# Patient Record
Sex: FEMALE | Race: WHITE
[De-identification: names, ages, dates, MRNs, and addresses within clinical notes are randomized per-mention and may not be internally consistent; named-entity substitution may affect disease eponyms.]

---

## 2017-01-27 ENCOUNTER — HOSPITAL ENCOUNTER (EMERGENCY)
Dept: HOSPITAL 84 - D.ER | Age: 47
Discharge: LEFT BEFORE DISCHARGE | End: 2017-01-27
Payer: MEDICAID

## 2017-01-27 VITALS — BODY MASS INDEX: 34.5 KG/M2

## 2017-01-27 DIAGNOSIS — R42: Primary | ICD-10-CM

## 2017-02-09 ENCOUNTER — HOSPITAL ENCOUNTER (OUTPATIENT)
Dept: HOSPITAL 84 - D.RAD | Age: 47
Discharge: HOME | End: 2017-02-09
Attending: GENERAL PRACTICE
Payer: MEDICAID

## 2017-02-09 VITALS — BODY MASS INDEX: 34.5 KG/M2

## 2017-02-09 DIAGNOSIS — S86.911A: Primary | ICD-10-CM

## 2017-05-03 ENCOUNTER — HOSPITAL ENCOUNTER (OUTPATIENT)
Dept: HOSPITAL 84 - D.MAMMO | Age: 47
Discharge: HOME | End: 2017-05-03
Attending: GENERAL PRACTICE
Payer: MEDICAID

## 2017-05-03 VITALS — BODY MASS INDEX: 34.5 KG/M2

## 2017-05-03 DIAGNOSIS — N64.4: Primary | ICD-10-CM

## 2017-05-05 ENCOUNTER — HOSPITAL ENCOUNTER (OUTPATIENT)
Dept: HOSPITAL 84 - D.RAD | Age: 47
Discharge: HOME | End: 2017-05-05
Attending: ORTHOPAEDIC SURGERY
Payer: MEDICAID

## 2017-05-05 VITALS — BODY MASS INDEX: 34.5 KG/M2

## 2017-05-05 DIAGNOSIS — M25.511: Primary | ICD-10-CM

## 2017-06-21 LAB
ERYTHROCYTE [DISTWIDTH] IN BLOOD BY AUTOMATED COUNT: 13.2 % (ref 11.5–14.5)
HCT VFR BLD CALC: 43.5 % (ref 36–48)
HGB BLD-MCNC: 15 G/DL (ref 12–16)
MCH RBC QN AUTO: 34.3 PG (ref 26–34)
MCHC RBC AUTO-ENTMCNC: 34.5 G/DL (ref 31–37)
MCV RBC: 99.5 FL (ref 80–100)
PLATELET # BLD: 180 10X3/UL (ref 130–400)
PMV BLD AUTO: 10.4 FL (ref 7.4–10.4)
RBC # BLD AUTO: 4.37 10X6/UL (ref 4–5.4)
WBC # BLD AUTO: 7.8 10X3/UL (ref 4.8–10.8)

## 2017-06-22 ENCOUNTER — HOSPITAL ENCOUNTER (OUTPATIENT)
Dept: HOSPITAL 84 - D.OPS | Age: 47
Discharge: HOME | End: 2017-06-22
Attending: ORTHOPAEDIC SURGERY
Payer: MEDICAID

## 2017-06-22 VITALS — DIASTOLIC BLOOD PRESSURE: 71 MMHG | SYSTOLIC BLOOD PRESSURE: 116 MMHG

## 2017-06-22 VITALS — WEIGHT: 205 LBS | BODY MASS INDEX: 34.16 KG/M2 | HEIGHT: 65 IN

## 2017-06-22 DIAGNOSIS — Z01.812: ICD-10-CM

## 2017-06-22 DIAGNOSIS — M75.101: Primary | ICD-10-CM

## 2017-06-22 DIAGNOSIS — Z96.611: ICD-10-CM

## 2017-06-22 NOTE — OP
PATIENT NAME:  ANUSHKA GARNICA                  MEDICAL RECORD: [id number]
:70                                             LOCATION:D.OPS          
                                                         ADMISSION DATE:        
SURGEON:  RAISSA PARKER MD        
 
 
DATE OF OPERATION:  2017
 
PREOPERATIVE DIAGNOSIS:  Rotator cuff tear of the right shoulder, status post
total shoulder arthroplasty.
 
POSTOPERATIVE DIAGNOSIS:  Rotator cuff tear of the right shoulder, status post
total shoulder arthroplasty.
 
PROCEDURES:
1.  Right shoulder arthroscopy with arthroscopic evaluation.
2.  Open rotator cuff repair.
 
SURGEON:  Raissa Parker MD.
 
ANESTHESIA:  General.
 
INTRAOPERATIVE COMPLICATIONS:  None.
 
SUMMARY OF PATHOLOGIC FINDINGS:  The total shoulder arthroplasty appeared to be
well fixed as did the glenoid component.  The patient did have a rotator cuff
tear that was visualized from the implant surface side; however, it required
mini arthrotomy for rotator cuff repair.
 
OPERATIVE SUMMARY IN DETAIL:  After obtaining the appropriate preoperative
orthopedic surgery consent as well as anesthetic consultation, evaluation and
clearance, the patient was brought to the operating room and placed on the
operating table in supine position.  After general laryngeal mask was
administered, the patient was placed in the left lateral decubitus position. 
All pressure points were well padded to include down leg peroneal pad as well as
axillary roll.  The patient was held firmly to the operating table using the
vacuum pack suction system.  Right upper extremity and shoulder were prepped and
draped in routine sterile fashion.  The arm was held in the Arthrex traction
boom at 30 degrees of forward flexion, 30 degrees of abduction with 10 pounds of
traction laterally.  Arthroscopy was established in the glenohumeral joint from
a posterior portal.  Anterior portal was established in the anterior safe
interval.  Diagnostic arthroscopy did reveal the above findings, that is, the
prosthesis and the glenoid component appeared to be stable without looseness. 
Attention was then turned to the subacromial space.  While on the subacromial
space, the patient had substantial amounts of scar tissue and adhesions.  At
this point, the decision was made to proceed with the mini arthrotomy. 
Anterolateral mini arthrotomy was created, taken down to the superficial and
deep deltoid fascia for direct visualization of the rotator cuff tear. 
Combination of rongeurs, knife as well as the arthroscopic shaver were utilized
to debride the rotator cuff and decorticate the lateral portion of the greater
tuberosity.  The prosthesis itself did not seem to be proud in any way.  Two #2
FiberTapes were placed in an inverted mattress style fashion and each were
anchored laterally with a 5.5 SwiveLock from Arthrex down both the anterior and
posterior to the stem with good fixation.  This resulted in excellent anatomic
repair of the rotator cuff.  Having completed this, the wound was copiously
irrigated.  Superficial and deep fascia were closed with #1 Vicryl, followed by
2-0 Vicryl and skin staples.  Sterile dressings were applied.  The patient was
awakened and taken to the recovery room in stable condition.  All final needle
 
 
 
OPERATIVE REPORT                               [id number]    ANUSHKA GARNICA
 
 
and sponge counts were correct.
 
TRANSINT:[id number] Voice Confirmation ID: [id number] DOCUMENT ID: [id number]
                                           
                                           ELENA SERRANO, RAISSA YARBROUGH        
 
 
 
 
 
 
 
 
 
 
 
 
 
 
 
 
 
 
 
 
 
 
 
 
 
 
 
 
 
 
 
 
 
 
 
 
 
 
 
 
 
 
CC:                                                             [id number]
DICTATION DATE: 17     :     17      [facility] 
                                                                      17
[address] [place] [address]

## 2017-08-05 ENCOUNTER — HOSPITAL ENCOUNTER (EMERGENCY)
Dept: HOSPITAL 84 - D.ER | Age: 47
Discharge: HOME | End: 2017-08-05
Payer: MEDICAID

## 2017-08-05 VITALS — BODY MASS INDEX: 34.1 KG/M2

## 2017-08-05 DIAGNOSIS — F32.9: Primary | ICD-10-CM

## 2017-08-05 DIAGNOSIS — E03.9: ICD-10-CM

## 2017-08-05 DIAGNOSIS — R11.0: ICD-10-CM

## 2017-08-05 DIAGNOSIS — Z86.59: ICD-10-CM

## 2017-10-05 ENCOUNTER — HOSPITAL ENCOUNTER (OUTPATIENT)
Dept: HOSPITAL 84 - D.MRI | Age: 47
Discharge: HOME | End: 2017-10-05
Attending: ORTHOPAEDIC SURGERY
Payer: MEDICAID

## 2017-10-05 VITALS — BODY MASS INDEX: 34.1 KG/M2

## 2017-10-05 DIAGNOSIS — M54.16: Primary | ICD-10-CM

## 2017-10-24 ENCOUNTER — HOSPITAL ENCOUNTER (OUTPATIENT)
Dept: HOSPITAL 84 - D.OPS | Age: 47
Discharge: HOME | End: 2017-10-24
Attending: ORTHOPAEDIC SURGERY
Payer: MEDICAID

## 2017-10-24 VITALS — BODY MASS INDEX: 30.3 KG/M2

## 2017-10-24 DIAGNOSIS — M53.3: Primary | ICD-10-CM

## 2017-10-25 NOTE — PRO
PATIENT:ANUSHKA OJSEPH                         MEDICAL RECORD: [id number]
: 70                                            LOCATION:D.OPS          
                                                         ADMISSION DATE: 10/24/17
 
PROCEDURE PERFORMED BY: JUSTO ALMAGUER MD              
 
 
DATE OF PROCEDURE:  10/24/2017
 
Ms. Joseph is a 47-year-old  female, who is referred by Dr. Pollo Marquez for left leg radiculopathy and hip pain.  The patient relates that she
has had bilateral hip surgery for avascular necrosis and bilateral hip
replacement.  The patient relates that she is having hip discomfort and some
radiation into her left leg.  The patient has been seen by Dr. Pollo Marquez and
worked up.  MRI of the back suggests that patient has significant disc disease
along multiple levels from T12-L1, L1-L2, L4-L5 and L5-S1.  The patient has
small-to-moderate bulging discs along those levels with no impingement on cord
though.  The patient presents today with a pain level of 8/10.  The patient does
relate that it has been there for about 3 months.  The patient is looking for
resolution and evaluation of pain.  
 
After evaluating the patient, it should be noted that the patient is currently
on no anticoagulation medication.  THE PATIENT HAS ALLERGIES TO BACTRIM,
ERYTHROMYCIN, PEPCID AND LATEX.  The patient currently also has no heart
disease, cardiovascular or lung disease.  The patient is a well-proportioned
female.  Risks and benefits were explained to the patient including risk of
infection, bleeding and damage to underlying structures.  The patient
understands and accepts.  The patient has had an epidural in the past for labor
and understands the risk involved.  
 
The patient was placed in sitting position, Betadine prepped and draped.  The
patient had local anesthetic of 1% lidocaine used to raise a skin wheal over the
L4-L5 area.  A total of 10 mL of solution was injected which included 0.25%
bupivacaine along with 80 mg Depo-Medrol and approximately 5 mL of sterile
saline to bring it to a total volume of 10 mL.  The epidural space was located
with a loss of resistance technique with 17-gauge Tuohy needle.  After loss of
resistance location of the epidural space, the solution of 10 mL was injected
via the Tuohy needle into the epidural space at that time.  Needle was removed. 
The patient tolerated the procedure well.  
 
Upon examination, I also felt that the patient may have some SI joint
involvement on the left side.  Palpation over the SI joint elicited severe
discomfort.  Discussed the patient SI joint injection and she said absolutely. 
Risks and benefits again were explained with the patient.  The patient consents
to have the SI joint injected on the left side.  
 
We were able to use another 10 mL of 0.25% bupivacaine along with 80 mg
Depo-Medrol.  After skin wheal, a 1% lidocaine was raised over the left SI joint
area and a 22-gauge spinal needle was then used under ultrasound guidance to
locate the left SI joint and the medication of 0.25% bupivacaine along with 80
mg Depo-Medrol 10 mL was injected into the left SI joint area.  The patient
seemed to respond to the therapy and discomfort diminished from 8/10 to 4/10
after injection series.  
 
It should be noted that the patient is to be followed up in 1 week.  The patient
was discharged home.  The patient tolerated the procedure well.
 
TRANSINT:[id number] Voice Confirmation ID: [id number] DOCUMENT ID: [id number]
 
 
 
PROCEDURE NOTE                                 [id number]    ANUSHKA JOSEPH FARRELL MD              
 
 
 
Electronically Signed by JUSTO ALMAGUER on 10/25/17 at 1011
 
 
 
 
 
 
 
 
 
 
 
 
 
 
 
 
 
 
 
 
 
 
 
 
 
 
 
 
 
 
 
 
 
 
 
 
 
 
 
 
CC:                                                             [id number]
DICTATION DATE: 10/24/17 1428     :     10/24/17 2257      [facility] 
                                                                      10/24/17
[id number] [place] [address]

## 2017-11-01 ENCOUNTER — HOSPITAL ENCOUNTER (OUTPATIENT)
Dept: HOSPITAL 84 - D.OPS | Age: 47
Discharge: HOME | End: 2017-11-01
Attending: ORTHOPAEDIC SURGERY
Payer: MEDICAID

## 2017-11-01 VITALS — BODY MASS INDEX: 30.3 KG/M2

## 2017-11-01 VITALS — DIASTOLIC BLOOD PRESSURE: 95 MMHG | SYSTOLIC BLOOD PRESSURE: 134 MMHG

## 2017-11-01 DIAGNOSIS — M53.3: Primary | ICD-10-CM

## 2017-11-01 DIAGNOSIS — Z01.812: ICD-10-CM

## 2017-11-08 ENCOUNTER — HOSPITAL ENCOUNTER (OUTPATIENT)
Dept: HOSPITAL 84 - D.OPS | Age: 47
Discharge: HOME | End: 2017-11-08
Attending: SURGERY
Payer: MEDICAID

## 2017-11-08 VITALS — SYSTOLIC BLOOD PRESSURE: 114 MMHG | DIASTOLIC BLOOD PRESSURE: 71 MMHG

## 2017-11-08 VITALS — BODY MASS INDEX: 30.3 KG/M2

## 2017-11-08 DIAGNOSIS — M54.5: ICD-10-CM

## 2017-11-08 DIAGNOSIS — M53.3: Primary | ICD-10-CM

## 2017-11-15 ENCOUNTER — HOSPITAL ENCOUNTER (OUTPATIENT)
Dept: HOSPITAL 84 - D.OPS | Age: 47
Discharge: HOME | End: 2017-11-15
Attending: ORTHOPAEDIC SURGERY
Payer: MEDICAID

## 2017-11-15 VITALS — HEIGHT: 65 IN | WEIGHT: 216.45 LBS | BODY MASS INDEX: 36.06 KG/M2

## 2017-11-15 VITALS — SYSTOLIC BLOOD PRESSURE: 124 MMHG | DIASTOLIC BLOOD PRESSURE: 70 MMHG

## 2017-11-15 DIAGNOSIS — S39.012A: Primary | ICD-10-CM

## 2017-11-15 DIAGNOSIS — Z01.812: ICD-10-CM

## 2018-04-16 ENCOUNTER — HOSPITAL ENCOUNTER (OUTPATIENT)
Dept: HOSPITAL 84 - D.NM | Age: 48
Discharge: HOME | End: 2018-04-16
Attending: ORTHOPAEDIC SURGERY
Payer: MEDICAID

## 2018-04-16 VITALS — BODY MASS INDEX: 36 KG/M2

## 2018-04-16 DIAGNOSIS — M25.551: ICD-10-CM

## 2018-04-16 DIAGNOSIS — M53.3: Primary | ICD-10-CM

## 2018-08-06 ENCOUNTER — HOSPITAL ENCOUNTER (OUTPATIENT)
Dept: HOSPITAL 84 - D.MRI | Age: 48
Discharge: HOME | End: 2018-08-06
Attending: GENERAL PRACTICE
Payer: MEDICAID

## 2018-08-06 VITALS — BODY MASS INDEX: 36 KG/M2

## 2018-08-06 DIAGNOSIS — M54.16: Primary | ICD-10-CM

## 2018-09-20 ENCOUNTER — HOSPITAL ENCOUNTER (EMERGENCY)
Dept: HOSPITAL 84 - D.ER | Age: 48
Discharge: HOME | End: 2018-09-20
Payer: MEDICAID

## 2018-09-20 VITALS
WEIGHT: 193.4 LBS | BODY MASS INDEX: 32.22 KG/M2 | HEIGHT: 65 IN | BODY MASS INDEX: 32.22 KG/M2 | WEIGHT: 193.4 LBS | HEIGHT: 65 IN

## 2018-09-20 VITALS — SYSTOLIC BLOOD PRESSURE: 150 MMHG | DIASTOLIC BLOOD PRESSURE: 92 MMHG

## 2018-09-20 DIAGNOSIS — M79.1: Primary | ICD-10-CM

## 2018-09-20 DIAGNOSIS — M25.551: ICD-10-CM

## 2018-09-20 DIAGNOSIS — F17.200: ICD-10-CM

## 2018-09-21 ENCOUNTER — HOSPITAL ENCOUNTER (EMERGENCY)
Dept: HOSPITAL 84 - D.ER | Age: 48
Discharge: HOME | End: 2018-09-21
Payer: MEDICAID

## 2018-09-21 VITALS
BODY MASS INDEX: 32.22 KG/M2 | HEIGHT: 65 IN | HEIGHT: 65 IN | WEIGHT: 193.4 LBS | WEIGHT: 193.4 LBS | BODY MASS INDEX: 32.22 KG/M2

## 2018-09-21 VITALS — DIASTOLIC BLOOD PRESSURE: 89 MMHG | SYSTOLIC BLOOD PRESSURE: 142 MMHG

## 2018-09-21 DIAGNOSIS — M25.551: ICD-10-CM

## 2018-09-21 DIAGNOSIS — Z91.81: ICD-10-CM

## 2018-09-21 DIAGNOSIS — F17.200: ICD-10-CM

## 2018-09-21 DIAGNOSIS — M79.1: Primary | ICD-10-CM

## 2018-10-22 ENCOUNTER — HOSPITAL ENCOUNTER (OUTPATIENT)
Dept: HOSPITAL 84 - D.PT | Age: 48
Discharge: HOME | End: 2018-10-22
Attending: ORTHOPAEDIC SURGERY
Payer: MEDICAID

## 2018-10-22 VITALS — BODY MASS INDEX: 32.1 KG/M2

## 2018-10-22 DIAGNOSIS — S72.111D: Primary | ICD-10-CM
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
## 2019-02-21 NOTE — NUR
RESUMING PATIENT CARE. PATIENT IS ALERT AND ORIENTED. RESPIRATIONS ARE EVEN
AND UNLABORED. NO S/S OF DISTRESS. NO C/O PAIN. DENIES NEEDS AT THIS TIME.
CALL LIGHT WITHIN REACH. WILL CPOC.

## 2019-02-21 NOTE — HEMODYNAMI
PATIENT:ANUSHKA GARNICA                         MEDICAL RECORD: [id number]
: 70                                            LOCATION:[facility]     D.2119
[id number]                                       ADMISSION DATE: 19
 
 
 Generatedon:20198:02
Patient name: ANUSHKA GARNICA Patient #: [id number] Visit #: [id number] SSN: 
: 1970 Date
of study: 2019
Page: Of
Hemodynamic Procedure Report
****************************
Patient Data
Patient Demographics
Procedure consent was obtained
First Name: ANUSHKA        Gender: Female
Last Name: DANIKA          : 1970
Middle Initial: GAVIN     Age: 48 year(s)
Patient #: [id number]       Race: Unknown
Visit #: [id number]
Accession #:
[id number]
Additional ID: [id number]
Contact details
Address: [address] [place] [address] Phone: [phone]
State: [place]
City: [place]
Zip code: [address]
Past Medical History
Allergies
Allergen        Reaction        Date         Comments
Reported
Other allergy                   2019    BACTRIM,
ERYTHROMYCIN,
PEPCID,FLONASE,
DILAUDID, SULFA,
LATEX
Admission
Admission Data
Admission Date: 2019   Admission Time: 14:17
Room #: D.2119
Lab Results
Lab Result Date: 2019  Lab Result Time: 0:00
Biochemistry
Name         Units    Result                Min      Max
BUN          mg/dl    9        --(*---)--   7        18
Creatinine   mg/dl    0.9      --(-*--)--   0.6      1.3
CBC
Name         Units    Result                Min      Max
Hemoglobin   g/dl     15.6     --(--*-)--   13.5     17.5
Procedure
Procedure Types
Cath Procedure
Diagnostic Procedure
LHC
LHC w/Coronaries
Sedation Charges
Moderate Sedation up to 15 minutes
PCI Procedure
 
Coronary Stent
Coronary Stent Initial
Procedure Description
Procedure Date
Procedure Date: 2019
Procedure Start Time: 7:35
Procedure End Time: 7:58
Procedure Staff
Name                            Function
Garrick Garcia MD                   Performing Physician
Yazmin Marx RN                  Nurse
Duke Marie RT                     Scrub
Penny Corea RT               Monitor
Procedure Data
Cath Procedure
Fluoroscopy
Diagnostic fluoroscopy      Total fluoroscopy Time: 5.2
time: 5.2 min               min
Diagnostic fluoroscopy      Total fluoroscopy dose:
dose: 1069 mGy              1069 mGy
Contrast Material
Contrast Material Type                       Amount (ml)
Isovue 300                                   100
Entry Location
Entry     Primary  Successful  Side  Size  Upsize Upsize Entry    Closure     Jensen
ccessful  Closure
Location                             (Fr)  1 (Fr) 2 (Fr) Remarks  Device        
          Remarks
Radial                         Right 6 Fr                         Mechanical
artery                               Short                        Compression
Estimated blood loss: 10 ml
Diagnostic catheters
Device Type               Used For           End Catheter
Placement
DIAGNOSTIC Ekron 110cm 5  Procedure
Fr catheter (008625)
Procedure Complications
No complications
Procedure Medications
Medication           Administration Route Dosage
Oxygen               etCO2 Nasal cannula  2 l/min
Lidocaine 2%         added to field       20
Heparin Flush Bag    added to field       2 bags
(1000units/500ml NS)
0.9% NaCl            I.V.                 100 ml/hr
Versed               I.V.                 2 mg
Fentanyl             I.V.                 100 mcg
Versed               I.V.                 1 mg
Fentanyl             I.V.                 50 mcg
Radial Cocktail      I.A.                 1 syringe
(Verapomil 2mg/Nitro
400mcg/Heparin
1500units)
Heparin Bolus        I.V.                 95187 units
Versed               I.V.                 1 mg
Fentanyl             I.V.                 50 mcg
Effient              P.O.                 60 mg
Hemodynamics
Rest
HGB: 15.6 (g/dl) Heart Rate: 77 (bpm)
 
Pressure Samples
Time     Site     Value (mmHg) Purpose      Heart      Use
Rate(bpm)
7:39     LV       115/3,17     Snapshot     73
7:40     AO       105/71(85)   Pullback     80
7:40     LV       110/0,18     Pullback     80
Gradients
Valve  Time  Site 1   Site 2     Mean    SEP/DFP    Peak To Heart  Use
(mmHg)  (sec/min)  Peak    Rate
(mmHg)  (bpm)
Aortic 7:40  LV       AO         4       4          5       80
110/0,18 105/71(85)
Calculations
Valve    P-P      Mean      Valve     Index  Valve    Source
Name              Gradient  Area             Flow
(cm2)
Aortic   5        4
5        4
Snapshots
Pre Cath      Intra         NCS           Post Cath
Vital Signs
Time    Heart  Resp   SPO2 etCO2   NIBP (mmHg) Rhythm  Pain Status Sedation
Rate   (ipm)  (%)  (mmHg)                                  Level
(bpm)
7:22:36 72     19     94   33      135/86(102) NSR     4 (11) ,    10(A)
Distressing
7:26:44 77     21     94   29.2    119/82(93)  NSR     4 (11) ,    10(A)
Distressing
7:30:46 74     14     92   22.5    124/79(97)  NSR     4 (11) ,    10(A)
Distressing
7:34:51 72     12     93   35.3    120/76(90)  NSR     4 (11) ,    10(A)
Distressing
7:40:01 94     11     94   19.5    98/66(93)   NSR     4 (11) ,    9(A)
Distressing
7:44:00 74     11     93   33      111/65(93)  NSR     4 (11) ,    9(A)
Distressing
7:48:04 74     12     94   21.8    113/64(96)  NSR     4 (11) ,    9(A)
Distressing
7:52:10 76     13     92   30      111/67(85)  NSR     4 (11) ,    9(A)
Distressing
7:56:10 76     15     94   36      118/78(91)  NSR     0 (11) , No 10(A)
pain
Medications
Time    Medication       Route   Dose    Verified Delivered Reason          Note
s    Effectiveness
by       by
7:18:20 Oxygen           etCO2   2 l/min Garrick     Buffie    used for
Nasal           Jose Marx RN   procedure
cannula
7:18:27 Lidocaine 2%     added   20ml    Garrick     Garrick      for local
to      vial    Jose Garcia MD  anesthetic
field
7:18:32 Heparin Flush    added   2 bags  Garrick     Garrick      used for
Bag              to              Jose Garcia MD  procedure
(1000units/500ml field
NS)
7:18:40 0.9% NaCl        I.V.    100     Garrick     Buffie    Per physician
ml/hr   Jose Marx RN
7:29:29 Versed           I.V.    2 mg    Garrick     Buffie    for sedation
Jose Marx RN
 
7:29:35 Fentanyl         I.V.    100 mcg Garrick     Buffie    for sedation
Jose Marx RN
7:34:54 Versed           I.V.    1 mg    Garrick     Buffie    for sedation
Jose Marx RN
7:34:58 Fentanyl         I.V.    50 mcg  Garrick     Buffie    for sedation
Jose Marx RN
7:37:29 Radial Cocktail  I.A.    1       Garrick     Garrick      for
(Verapomil               syringe Jose Garcia MD  vasodilation
2mg/Nitro
400mcg/Heparin
1500units)
7:46:24 Heparin Bolus    I.V.    10,000  Garrick     Buffie    for             veri
fied
units   Garcia MD Marx RN   anticoagulation with dr garcia
7:51:38 Versed           I.V.    1 mg    Garrick Arce    for sedation
Jose Marx RN
7:51:42 Fentanyl         I.V.    50 mcg  Garrick Arce    for sedation
Jose Marx RN
7:58:53 Effient          P.O.    60 mg   Garrick Arce    for
Jose Marx RN   antiplatelet
therapy
Procedure Log
Time     Note
7:01:06  Signed procedure consent form obtained from patient.
7:01:08  Diagnostic Cath status Elective
7:01:09  Time tracking: Regular hours (M-F 7:00 - 5:00)
7:01:15  Plan of Care:Hemodynamics will remain stable., Cardiac
rhythm will remain stable., Comfort level will be
maintained., Respiratory function will remain
adequate., Patient/ family verbilizes understanding of
procedure., Procedure tolerated without complication.,
Recovers from procedure without complications..
7:01:22  Yazmin Marx RN sent for patient. Start room use.
7:02:37  H&P Date Dictated: 2019 Within 30 days and on
chart..
7:04:50  Patient allergic to Other allergyBACTRIM,
ERYTHROMYCIN, PEPCID,FLONASE, DILAUDID, SULFA, LATEX
7:05:58  Lab Result : Creatinine 0.9 mg/dl
7:05:58  Lab Result : BUN 9 mg/dl
7:05:58  Lab Result : Hemoglobin 15.6 g/dl
7:10:11  Patient received from Med II to CCL 1 Alert and
oriented. Tansferred to table in Supine position.
7:10:12  Warm blankets applied, and alayna hugger turned on for
patient comfort.
7:10:12  Correct patient and procedure confirmed by team.
7:10:13  ECG and BP/O2 sat monitors applied to patient.
7:18:20  Oxygen 2 l/min etCO2 Nasal cannula was administered by
Yazmin Marx RN; used for procedure;
7:18:27  Lidocaine 2% 20ml vial added to field was administered
by Garrick Garcia MD; for local anesthetic;
7:18:32  Heparin Flush Bag (1000units/500ml NS) 2 bags added to
field was administered by Garrick Garcia MD; used for
procedure;
7:18:40  0.9% NaCl 100 ml/hr I.V. was administered by Yazmin Marx RN; Per physician;
7:21:34  Vital chart was started
7:21:35  Baseline sample Acquired.
7:21:39  Rhythm: sinus rhythm
7:21:40  Full Disclosure recording started
 
7:21:40  Pre-procedure instructions explained to patient.
7:21:41  Pre-op teaching completed and patient verbalized
understanding.
7:21:42  Family in patients room.
7:21:44  Patient NPO since Midnight.
7:21:46  Is patient on blood thinner?No
7:21:48  Patient diabetic? No.
7:21:49  Patient not pregnant. Patient has had hysterectomy.
7:21:52  Previous problem with sedation/anesthesia? No ?
7:21:53  Snore? Yes
7:21:54  Sleep apnea? No
7:21:55  Deviated septum? No
7:21:55  Opens mouth fully? Yes
7:21:56  Sticks out tongue? Yes
7:21:58  Airway obstruction? No ?
7:22:09  Dentures? No ?
7:22:13  Modified Judson's test Ulnar < 7 seconds
7:22:15  Patient pain scale 4/10 ?.
7:22:43  IV patent on arrival in left antecubital with 0.9%
NaCl at [facility].
7:22:45  Lab results completed and on chart.
7:22:48  Right Radial & Right Groin area was prepped with
chlora-prep and draped in sterile fashion
7:22:49  Alarms reviewed by R. N.
7:22:49  Sharps counted by scrub and verified by R.N.
7:22:52  Use device set Radial Dx or PCI
7:22:53  ACIST Syringe (87365) opened to sterile field.
7:22:53  Medline Cath Pack ([id number]) opened to sterile field.
7:22:54  ACIST Hand Control (24525) opened to sterile field.
7:22:54  ACIST Manifold (10583) opened to sterile field.
7:22:55  Tegaderm 4 x 4 (1626W) opened to sterile field.
7:22:57  Bag Decanter (S) opened to sterile field.
7:22:57  DIAGNOSTIC WIRE .035 260cm J wire (418063) opened to
sterile field.
7:22:58  MBrace Wrist Support ([id number]) opened to sterile
field.
7:22:59  NEEDLE Cook 21G 4cm Radial (L54473) opened to sterile
field.
7:23:00  SHEATH 6FR Slender () opened to sterile field.
7::  --------ALL STOP TIME OUT------
7::24  Final Timeout: patient, procedure, and site verified
with staff and physician. All members of the team are
in agreement.
7::  Right Radial & Right Groin site verified by team.
7::  Fire Safety Assessment: A--An alcohol-based skin
anteseptic being used preoperatively., C--Open oxygen
or nitrous oxide is being used., D--An ESU, laser, or
fiber-optic light is being used.
7::31  Physical assessment completed. ASA score P 2 - A
patient with mild systemic disease as per Garrick Garcia MD.
7::34  Sedation plan: IV Moderate Sedation Medication:Versed,
Fentanyl
7:29:29  Versed 2 mg I.V. was administered by Yazmin Marx RN;
for sedation;
7::35  Fentanyl 100 mcg I.V. was administered by Yazmin Marx
RN; for sedation;
7:29:41  Zero performed for pressure channel P1
7:29:52  Zero performed for pressure channel P1
7:29:55  Zero performed for pressure channel P1
 
7:30:09  Zero performed for pressure channel P1
7:31:45  Zero performed for pressure channel P1
7:34:50  Zero performed for pressure channel P1
7:34:54  Versed 1 mg I.V. was administered by Yazmin Marx RN;
for sedation;
7:34:58  Fentanyl 50 mcg I.V. was administered by Yazmin Marx
RN; for sedation;
7:35:18  Procedure started.
7:35:41  Local anesthetic to right radial artery with Lidocaine
2% by Garrick Garcia MD.**INITIAL ACCESS ONLY**
7:36:55  A 6 Fr Short sheath was inserted into the Right Radial
artery
7:37:29  Radial Cocktail (Verapomil 2mg/Nitro 400mcg/Heparin
1500units) 1 syringe I.A. was administered by Garrick Garcia MD; for vasodilation;
7:37:29  A DIAGNOSTIC Tiger 110cm 5 Fr catheter (436087) was
advanced over the wire and used for Procedure.
7:38:47  Injector settings: Ml/sec: 51, Volume: 15,
7:38:58  LV gram done using MADSEN
7:39:22  LV hemodynamics recorded.
7:39:48  EF : 55 %
7:41:08  LCA angiography performed.
7:42:17  RCA angiography performed.
7:42:40  Catheter exchanged over wire.
7:43:08  TUBING High Pressure Extension Tubing (Jose)
([id number]) opened to sterile field.
7:43:09  BMW 300cm Straight Universal 2 wire ([id number]) opened
to sterile field.
7:43:10  INFLATOR Merit BasixCompak (WO2305) opened to sterile
field.
7:43:28  GUIDE 6FR AR 1.0 catheter ([id number]) opened to sterile
field.
7:44:46  6 Fr AR 1 guide catheter was inserted over the wire
7:46:24  Heparin Bolus 10,000 units I.V. was administered by
Yazmin Marx RN; for anticoagulation; verified with dr garcia
7:48:29   wire advanced.
7:48:53  Wire advanced across lesion.
7:51:38  Versed 1 mg I.V. was administered by Yazmin Marx RN;
for sedation;
7:51:42  Fentanyl 50 mcg I.V. was administered by Yazmin Marx
RN; for sedation;
7:52:22  Place stent Inflation Number: 1 A INTEGRITY OTW 3.5 X
22 stent ([id number]) was prepped and advanced across
the Prox RCA. The stent was deployed at 14 MARIE for
0:10 (min:sec).
7:52:42  Stent catheter was removed intact over wire.
7:52:43  Wire removed.
7:52:43  Guide catheter removed.
7:53:29  Procedure ended.(Physican Out)
7:54:46  TR BAND Large ([id number]) opened to sterile field.
7:54:55  Sheath removed intact; hemostasis achieved with
Mechanical Compression to the Right Radial artery.
7:55:25  Fluoroscopy time 05.20 minutes.
7:55:30  Fluoroscopy dose: 1069 mGy
7:55:30  Flurop Dose total: 1069
7:55:33  Contrast amount:Isovue 300 100ml.
7:55:38  TR band inflated with 10cc of air.
7:57:08  Post-procedure physical assessment completed. ASA
score P 2 - A patient with mild systemic disease as
 
per Garrick Garcia MD.
7:57:12  Post procedure rhythm: sinus rhythm
7:57:13  Estimated blood loss: 10 ml
7:57:15  Post procedure instruction explained to
patient.Patient verbalizes understanding.
7:57:15  Patient needs reinforcement of post procedure
teaching.
7:57:43  Procedure type changed to Cath procedure, Diagnostic
procedure, LHC, LHC w/Coronaries, Sedation Charges,
Moderate Sedation up to 15 minutes, PCI procedure,
Coronary Stent, Coronary Stent Initial
7:58:33  Procedure and supply charges have been captured,
reviewed, submitted and are correct.
7:58:35  Procedure Complication : No complications
7:58:37  Vital chart was stopped
7:58:37  See physician's report for complete and final results.
7:58:39  Report given to PCU.
7:58:41  Patient transfered to PCU with Bed.
7:58:43  Procedure ended.
7:58:43  Full Disclosure recording stopped
7:58:46  End room use (Document Last)
7:58:53  Effient 60 mg P.O. was administered by Yazmin aMrx RN;
for antiplatelet therapy;
Intervention Summary
Intervention Notes
Time    ActionType  Lesion and  Equipment   Action#  Pressure  Duration
Attributes  Used
7:52:22 Place stent Prox RCA    INTEGRITY   1        14        00:10
OTW 3.5 X
22 stent
([id number])
Device Usage
Item Name   Manufacture  Quantity  Catalog      Hospital Part    Current Minimal
 Lot# /
Number       Charge   Number  Stock   Stock   Serial#
Code
ACIST       Acist        1         32322        965394   253266  553066  20
Syringe     Medical
(62583)     Systems Inc
Medline     Medline      1         [id number]    351278   33525   674502  5
Cath Pack
([id number])
ACIST Hand  Acist        1         60008        987966   513973  712584  5
Control     Medical
(46464)     Systems Inc
ACIST       Acist        1         08949        359829   646661  496688  5
Manifold    Medical
(31088)     Systems Inc
Tegaderm 4  3M           1         1626W        168716   481783  489655  5
x 4 (1626W)
Bag         Microtek     1         S        798724   83112   520859  5
Decanter    Medical Inc.
()
DIAGNOSTIC  St Ahmet      1         183255       553275   833902  935161  30
WIRE .035
260cm J
wire
(541884)
MBrace      Advanced     1         [id number]  288789   76983   194523  5
Wrist       Vascular
 
Support     Dynamics
(509211866)
NEEDLE Cook Cook Medical 1         P84921       968827   246311  115899  5
21G 4cm
Radial
(L97822)
SHEATH 6FR  Terumo       1         [id number]   500583   432625  654749  5
Slender
()
DIAGNOSTIC  Terumo       1               614078   204046  223714  5
Tiger 110cm
5 Fr
catheter
(840201)
TUBING High Merit        1         [id number]      173926   45121   846815  10
Pressure    Medical
Extension
Tubing
(Garcia)
(HO6103P)
BMW 300cm   Abbott       1         9923019      320558   857978  396033  5
Straight    Vascular
[place] 2
wire
(1764352)
INFLATOR    Merit        1         US8172       925824   860312  002576  15
[facility]       Medical
BasixCompak
(XK8828)
GUIDE 6FR   Medtronic    1         [id number]      583739   43303   075502  1
AR 1.0
catheter
(TC6HN12)
INTEGRITY   Medtronic    1         [id number]    360582   111306  287410  1      
 [id number]
OTW 3.5 X
22 stent
([id number])
TR BAND     Terumo       1         EMI33-JQL    849456   824674  603768  40
Large
(PRB49PTB)
Signature Audit [place]
Stage           Time        Signature      Unsigned
Intra-Procedure 2019   Penny Croea
8:02:00 AM  RT(R)
Signatures
Monitor : Penny Corea Signature :
RT                       ______________________________
Date : ______________ Time :
______________
 
 
 
 
 
[facility]                                          
[address]                           
[place] [address]

## 2019-02-21 NOTE — NUR
PATIENT RESTING, SEMI FOWLERS. RESPIRATIONS EVEN AND UNLABORED. PT REPORTS CP
IS A 3/10. WILL CONTINUE TO MONITOR.

## 2019-02-22 NOTE — MORECARE
CASE MANAGEMENT DISCHARGE SUMMARY
 
 
PATIENT: ANUSHKA GARNICA GAVIN            UNIT: [id number]
ACCOUNT#: [id number]                       ADM DATE: 19
AGE: 48     : [birth year]  SEX: F            ROOM/BED: D.5649    
AUTHOR: ENDER CHAVES                             PHYSICIAN:                               
 
REFERRING PHYSICIAN: ELSY JOHNSON M.D.          
DATE OF SERVICE: 19
Discharge Plan
 
 
Patient Name: ANUSHKA GARNICA
Facility: [facility]:[place]
Encounter #: [id number]
Medical Record #: [id number]
: 1970
Planned Disposition: Home
Anticipated Discharge Date: 19
 
Discharge Date: 
Expected LOS: 1
Initial Reviewer: [id number]
Initial Review Date: 2019
Generated: 19  10:45 am 
  
 
 
 
 
 
 
Coverage Notice
 
Reviewer: [id number] [place] Pastor
 
Notice Issued Date-Time: 2019  14:10
Notice Type: Medicare Outpatient Observation Notice
 
Notice Delivered To: Patient
Relationship to Patient: 
Representative Name: 
 
Delivery Method: HAND - Hand Delivered
Cici Days:
Prior Verbal Notification: 
 
Recipient Understood Notice: Yes
Recipient Signature: Yes
 
Med Rec Note Co-signed by Attending:
 
Coverage Notice Comment:  
Patient Name: ANUSHKA GARNICA
Encounter #: [id number]
Page [id number]
 
 
 
 
 
Electronically Signed by ENDER CHAVES on 19 at 0945
 
 
 
 
 
 
**All edits/amendments must be made on the electronic document**
 
DICTATION DATE: 19     : STEPHANIE  19     
RPT#: [id number]                                DC DATE:        
                                               STATUS: ADM IN  
[facility]
[address] [place] [address]
***END OF REPORT***

## 2019-02-26 NOTE — EC
PATIENT:ANUSHKA GARNICA        DATE OF SERVICE: 02/21/19
SEX: F                                  MEDICAL RECORD: [id number]
DATE OF BIRTH: [birth year]                        LOCATION:D.M2      D.211
AGE OF PATIENT: 48                             ADMISSION DATE: 02/21/19
 
REFERRING PHYSICIAN:                               
 
INTERPRETING PHYSICIAN: DAI CORNELL MD             
 
 
 
                             ECHOCARDIOGRAM REPORT
  ECHO CHARGES 4               ECHO COMPLETE                 Date: 02/22/19
 
 
 
CLINICAL DIAGNOSIS: CHEST PAIN/CAD                
 
                         ECHOCARDIOGRAPHIC MEASUREMENTS
      (adult normal given)
   AC root (d.<3.7cm) 3.4  cm   LV Septum d (<1.2 cm> 1.4  cm
      Valve Excursion 1.4  cm     LV Septum (systole) 1.6  cm
Left Atria (s.<4.0cm> 3.1  cm          LVPW d(<1.2cm) 1.7  cm
        RV (d.<2.3cm) 3.5  cm           LVPW (sytole) 1.7  cm
  LV diastole(<5.6CM) 5.0  cm       MV E-F(>70mm/sec)      cm
           LV systole 3.8  cm           LVOT Diameter 2.0  cm
       MV exc.(>10mm) 1.5  cm
Est.ejection fraction (50-75%)     %
 
   DOPPLER:
     LVIT      cm/sec A 54.0 cm/sec E 42.0  cm/sec
       LA      cm/sec      RVSP 21   mmHg
     LVOT 78   cm/sec   AOP1/2T      m/s
  Asc. Ao 103  cm/sec
     RVOT 75   cm/sec
       RA      cm/sec
         cm/sec
 AV Gradient Peak 4.27 mmHg  AV Mean 2.21 mmHg  AV Area 2.3  cm
 MV Gradient Peak 3.23 mmHg  MV Mean 1.21 mmHg  MV Area      cm
   COMMENTS:                                              
 
 
 Cardiac Sonographer: Brianna ROJAS              
      Cardiologist: Brianna Garcia                
             TAPE# PACS           
                                       Pericardial Effusion N                        
 
 
DATE OF SERVICE:  02/22/2019
 
Echocardiogram
 
FINDINGS:
1. Left ventricular chamber size is within normal limits.  Left ventricular
systolic function is normal.  Overall ejection fraction estimated at 60%.
2. Left atrium, right atrium, and right ventricle chamber sizes are within
normal limits.
3. Valvular structures have normal structure and motion.
 
 
 
ECHOCARDIOGRAM REPORT                          [id number]    ANUSHKA GARNICA
 
 
4. Doppler interrogation reveals only trace tricuspid regurgitation, no other
valvular insufficiency, or stenosis and pulmonary systolic pressure is estimated
at 21 mmHg.
5. No evidence of pericardial effusion or left ventricular thrombus.
 
TRANSINT:[id number] Voice Confirmation ID: [id number] DOCUMENT ID: [id number]
                                           
                                           DAI CORNELL MD             
 
 
 
Electronically Signed by DAI CORNELL on 02/26/19 at 1118
 
 
 
 
 
 
 
 
 
 
 
 
 
 
 
 
 
 
 
 
 
 
 
 
 
 
 
 
 
 
 
 
 
 
CC:                                                             [id number]
DICTATION DATE: 02/22/19 1515     :     02/22/19 2257      DIS IN  
                                                                      02/22/19
[address] [place] [address]

## 2019-07-27 NOTE — NUR
REC'D FROM CATH LAB VIA BED.  AROUSES EASILY TO VERBAL STIMULI.  DENIES ANY
FURTHER CHEST PAIN.  CONNECTED TO BEDSIDE MONITOR AND VS OBTAINED.

## 2019-07-27 NOTE — HEMODYNAMI
PATIENT:ANUSHKA GARNICA                         MEDICAL RECORD: [id number]
: 70                                            [id number]                                       ADMISSION DATE: 19
 
 
 Generatedon:201916:34
Patient name: ANUSHKA GARNICA Patient #: [id number] Visit #: [id number] SSN: 
: 1970 Date
of study: 2019
Page: Of
Hemodynamic Procedure Report
****************************
Patient Data
Patient Demographics
Procedure consent was obtained
First Name: ANUSHKA        Gender: Female
Last Name: DANIKA          : 1970
Middle Initial: GAVIN     Age: 49 year(s)
Patient #: [id number]       Race: Unknown
Visit #: [id number]
Accession #:
[id number]
Additional ID: [id number]
Contact details
Address: [address] [place] [address] Phone: [phone]
State: [place]
City: [place]
Zip code: [address]
Past Medical History
Allergies
Allergen        Reaction        Date         Comments
Reported
Other allergy                   2019    BACTRIM,
ERYTHROMYCIN,
PEPCID,FLONASE,
DILAUDID, SULFA,
LATEX
Other allergy                   2019    See Chart
Admission
Admission Data
Admission Date: 2019   Admission Time: 14:38
Admit Source: Emergency
department
Height (in.): 64.96         BSA: 2.17 (m2)
Height (cm.): 165           BMI: 41.62 (kg/m2)
Weight (lbs.): 249.79
Weight (kg.): 113.3
Lab Results
Lab Result Date: 2019  Lab Result Time: 0:00
Biochemistry
Name         Units    Result                Min      Max
BUN          mg/dl    10       --(-*--)--   7        18
Creatinine   mg/dl    1.1      --(--*-)--   0.6      1.3
CBC
Name         Units    Result                Min      Max
Hemoglobin   g/dl     16.8     --(---*)--   13.5     17.5
Procedure
Procedure Types
Cath Procedure
 
Diagnostic Procedure
LHC
LH w/Coronaries
PCI Procedure
AMI/SVG/ PTCA or Stent
PTCA
PTCA Initial
Procedure Description
Procedure Date
Procedure Date: 2019
Procedure Start Time: 16:00
Procedure End Time: 16:29
Procedure Staff
Name                            Function
Yazmin Marx RN                  Nurse
Matt Pitts MD              Performing Physician
Zack Calle RT                  Scrub
Jazmyn Lundberg RT               Monitor
Procedure Data
Cath Procedure
Fluoroscopy
Diagnostic fluoroscopy      Total fluoroscopy Time: 5.1
time: 5.1 min               min
Diagnostic fluoroscopy      Total fluoroscopy dose: 851
dose: 851 mGy               mGy
Contrast Material
Contrast Material Type                       Amount (ml)
Isovue 370                                   93
Entry Location
Entry     Primary  Successful  Side  Size  Upsize Upsize Entry    Closure Succes
sful  Closure
Location                             (Fr)  1 (Fr) 2 (Fr) Remarks  Device        
      Remarks
Femoral                        Right 6 Fr                         Exoseal
artery                               Short
Estimated blood loss: 10 ml
Diagnostic catheters
Device Type               Used For           End Catheter
Placement
DIAGNOSTIC JL 4.0 5Fr     Procedure
catheter (439051R)
DIAGNOSTIC Pigtail 5Fr    Procedure
catheter (001517C)
Procedure Complications
No complications
Procedure Medications
Medication           Administration Route Dosage
Oxygen               etCO2 Nasal cannula  3 l/min
Lidocaine 2%         added to field       20
Heparin Bolus        I.V.                 3000 units
Zofran               I.V.                 4 mg
Versed               I.V.                 2 mg
Fentanyl             I.V.                 100 mcg
Atropine             I.V.                 2 mg
Integrilin (Bolus    I.V.                 10.2 ml
2mg/ml)
Integrilin Drip      I.V. drip            18.2 ml/hr
(75mg/100ml)
0.9% NaCl                                 100 ml/hr
Heparin Flush Bag    added to field       2 bags
 
(1000units/500ml NS)
Hemodynamics
Rest
BSA: 2.17 (m2) HGB: 16.8 (g/dl) O2 Consumption: Estimated: 220.81 (ml/min) O2 Co
nsumption indexed:
Estimated:101.76 (ml/min/m) Heart Rate: 79 (bpm)
Pressure Samples
Time     Site     Value (mmHg) Purpose      Heart      Use
Rate(bpm)
16:18    LV       121/31,31    Snapshot     95
16:18    LV       129/31,31    Snapshot     95
16:19    AO       133/87(110)  Pullback     95
Gradients
Valve  Time  Site Site 2      Mean    SEP/DFP    Peak To Heart  Use
1                (mmHg)  (sec/min)  Peak    Rate
(mmHg)  (bpm)
Aortic 16:19 LV   AO          35      38                 95
133/87(110)
Calculations
Valve    P-P      Mean      Valve     Index  Valve    Source
Name              Gradient  Area             Flow
(cm2)
Aortic            35
35
Snapshots
Pre Cath      Intra         NCS           Post Cath
Vital Signs
Time     Heart  Resp   SPO2 etCO2   NIBP (mmHg)  Rhythm    Pain Status  Sedation
Rate   (ipm)  (%)  (mmHg)                                      Level
(bpm)
15:57:30 79     11     99   0       Measuring    NSR w/ ST 10 (11) ,    10(A)
Elevation Unimaginable
unspeakable
15:58:33 80     18     100  0       186/106(148) NSR w/ ST 10 (11) ,    10(A)
Elevation Unimaginable
unspeakable
16:03:16 81     16     98   0       178/108(150) NSR w/ ST 10 (11) ,    10(A)
Elevation Unimaginable
unspeakable
16:07:44 128    21     99   0       141/99(114)  NSR w/ ST 6 (11) ,     10(A)
Elevation Intense
16:12:15 102    16     94   0       124/84(106)  NSR w/ ST 6 (11) ,     10(A)
Elevation Intense
16:16:37 95     12     96   0       132/84(105)  NSR w/ ST 3 (11) ,     10(A)
Elevation Tolerable
16:21:01 96     24     97   0       138/88(108)  NSR w/ ST 3 (11) ,     10(A)
Elevation Tolerable
16:25:25 93     18     98   0       138/89(115)  NSR w/ ST 3 (11) ,     10(A)
Elevation Tolerable
16:29:50 95     12     98   0       145/100(126) NSR w/ ST 3 (11) ,     10(A)
Elevation Tolerable
Medications
Time     Medication       Route   Dose  Verified Delivered Reason          Notes
    Effectiveness
by       by
15:55:02 Oxygen           etCO2   3     Matt Arce    used for
Nasal   l/min [facility] Marx RN   procedure
cannula       MD
15:55:09 Heparin Flush    added   2     Matt Gutierrez   used for
Bag              to      bags  [facility]   procedure
 
(1000units/500ml field         MD       MD
NS)
15:56:06 Lidocaine 2%     added   20ml  Matt Gutierrez   for local
to      vial  [facility]   anesthetic
field MD SERRANO
15:56:58 0.9% NaCl                100   Matt  Edithie
ml/hr [facility] RN, MD
15:57:14 Zofran           I.V.    4 mg  Matt Arce    Per physician
[facility] RN, MD
16:00:20 Versed           I.V.    2 mg  Matt Arce    for sedation
[facility] RN, MD
16:00:25 Fentanyl         I.V.    100   Matt Arce    for sedation
mcg   [facility] RN, MD
16:03:10 Heparin Bolus    I.V.    3000  Matt Arce    for             verif
ied
units [facility] Marx RN   anticoagulation with dr MD boone
16:04:51 Atropine         I.V.    2 mg  Matt Arce    Per physician   Cedric
 at
[facility] RN                   22 bpm
MD
16:08:41 Integrilin       I.V.    10.2  Matt Arce    Per physician   waste
d
(Bolus 2mg/ml)           ml    [facility] RN                   9.8 ml
MD                                 of vial
16:19:10 Integrilin Drip  I.V.    18.2  Matt Arce    for
(75mg/100ml)     drip    ml/hr [facility] RN   antiplatelet
MD                 therapy
Procedure Log
Time     Note
15:43:07 Patient Height : 64.96 inches
15:43:10 Patient Weight : 249.79 lbs
15:43:56 Lab Result : Hemoglobin 16.8 g/dl
15:43:56 Lab Result : Creatinine 1.1 mg/dl
15:43:56 Lab Result : BUN 10 mg/dl
15:44:11 Procedure type changed to Cath procedure, Diagnostic
procedure, LHC, LHC w/Coronaries, PCI procedure,
AMI/SVG/ PTCA or Stent, PTCA, PTCA Initial
15:54:52 Admit Source: Emergency department
15:55:02 Oxygen 3 l/min etCO2 Nasal cannula was administered by
Yazmin Marx RN; used for procedure;
15:55:06 **ACC** Patient presents with STEMI CCS Anginal Class
4--Inability to carry out any physical activity w/o
angina. Angina may occur at rest.
15:55:09 Heparin Flush Bag (1000units/500ml NS) 2 bags added to
field was administered by Matt Pitts MD; used for
procedure;
15:55:13 Procedure Status Emergent Heart Cath (AMI).
15:55:17 Yazmin Marx RN sent for patient. Start room use.
15:55:19 Time tracking: Call back (After hours or weekends)
15:55:25 Plan of Care:Hemodynamics will remain stable., Cardiac
rhythm will remain stable., Comfort level will be
maintained., Respiratory function will remain
adequate., Patient/ family verbilizes understanding of
procedure., Procedure tolerated without complication.,
Recovers from procedure without complications..
 
15:55:27 Patient arrives emergently.
15:55:34 Patient received from ED to CCL 1 Alert and oriented.
Tansferred to table in Supine position.
15:55:36 Signed procedure consent form obtained from patient.
15:55:38 Warm blankets applied, and alayna hugger turned on for
patient comfort.
15:55:39 Correct patient and procedure confirmed by team.
15:55:40 ECG and BP/O2 sat monitors applied to patient.
15:55:41 Vital chart was started
15:55:42 Baseline sample Acquired.
15:55:50 Rhythm: sinus rhythm , w/ ST elevation
15:55:52 Full Disclosure recording started
15:56:03 H&P Date Dictated: 2019 Emergent; H&P N/A.
15:56:05 Pre-procedure instructions explained to patient.
15:56:06 Lidocaine 2% 20ml vial added to field was administered
by Matt Pitts MD; for local anesthetic;
15:56:07 Family in waiting room.
15:56:10 Patient NPO since Breakfast.
15:56:24 Patient allergic to Other allergySee Chart
15:56:30 Is the patient allergic to Iodine/contrast media? No.
15:56:32 Was the patient premedicated? Yes
15:56:33 Is patient on blood thinner?Yes
15:56:36 **ACC** The patient was administered the following
blood thiners within the last 24 hours: **ACC**Plavix
15:56:47 Patient diabetic? No.
15:56:55 Snore? No
15:56:57 Sleep apnea? No
15:56:58 0.9% NaCl 100 ml/hr was administered by Yazmin Marx
RN; ;
15:57:05 Dentures? No ?
15:57:14 Zofran 4 mg I.V. was administered by Yazmin Marx RN;
Per physician;
15:57:19 Patient pain scale 10/10 ?.
15:57:26 IV patent on arrival in right forearm with 0.9% NaCl
at [facility].
15:57:34 Lab results completed and on chart.
15:57:38 Right groin area was prepped with chlora-prep and
draped in sterile fashion
15:57:40 Sharps counted by scrub and verified by R.N.
15:57:40 Alarms reviewed by R. N.
15:57:42 Physician arrived
15:57:42 Physician paged
15:57:44 Final Timeout: patient, procedure, and site verified
with staff and physician. All members of the team are
in agreement.
15:57:44 --------ALL STOP TIME OUT------
15:57:47 Right groin site verified by team.
15:57:52 Fire Safety Assessment: A--An alcohol-based skin
anteseptic being used preoperatively., C--Open oxygen
or nitrous oxide is being used., D--An ESU, laser, or
fiber-optic light is being used.
15:58:05 Physical assessment completed. ASA score P 3 - A
patient with severe systemic disease as per Matt Pitts MD.
15:58:15 3a) 45-59 Moderately reduced kidney function.
15:58:40 Maximum allowable contrast dose (3.7 X eGFR X 0.75)155
ml.
15:58:45 Sedation plan: IV Moderate Sedation Medication:Versed,
Fentanyl
15:58:49 Use device set Femoral Dx
 
15:59:28 Bag Decanter (2002S) opened to sterile field.
15:59:29 ACIST Syringe (96172) opened to sterile field.
15:59:30 Medline Cath Pack ([id number]) opened to sterile field.
15:59:32 ACIST Manifold (95907) opened to sterile field.
15:59:32 ACIST Hand Control (42925) opened to sterile field.
15:59:39 INFLATOR Merit BasixCompak (ZM1902) opened to sterile
field.
15:59:39 ERVIN Guide Wire (502-636) opened to sterile field.
15:59:40 SHEATH 6FR [place] (VCR325) opened to sterile field.
15:59:41 WHISPER 300cm guide wire ([id number]) opened to sterile
field.
15:59:51 Procedure started.
16:00:20 Versed 2 mg I.V. was administered by Yazmin Marx RN;
for sedation;
16:00:25 Fentanyl 100 mcg I.V. was administered by Yazmin Marx
RN; for sedation;
16:00:31 Local anesthetic to right femoral artery with
Lidocaine 2% by Matt Pitts MD.**INITIAL ACCESS
ONLY**
16:00:56 A 6 Fr Short sheath was inserted into the Right
Femoral artery
16:01:00 j wire advanced.
16:01:47 6 Fr HS1 guide catheter was inserted over the wire
16:02:31 Whisper wire advanced.
16:03:10 Heparin Bolus 3000 units I.V. was administered by
Yazmin Marx RN; for anticoagulation; verified with dr boone
16:03:27 GUIDE 6FR HS I catheter (LA6HSI) opened to sterile
field.
16:04:10 Wire advanced across lesion.
16:04:51 Atropine 2 mg I.V. was administered by Yazmin Marx RN;
Per physician; Cedric at 22 bpm
16:05:01 Inflate balloon Inflation number: 1 A EMERGE OTW 3.5 x
15 balloon ([id number]) was prepped and advanced
across the Prox , then inflated to 7 MARIE for
0:17 (min:sec) .
16:07:53 Inflation number: 2 The EMERGE OTW 3.5 x 15 balloon
([id number]) was reinflated across the Prox RCA , to
10 MARIE for 0:23 (min:sec) .
16:08:41 Integrilin (Bolus 2mg/ml) 10.2 ml I.V. was
administered by Yazmin Marx RN; Per physician; wasted
9.8 ml of vial
16:10:18 Balloon removed over the wire.
16:12:44 Inflate balloon Inflation number: 3 A EMERGE OTW 4.0 x
20 balloon ([id number]) was prepped and advanced
across the Prox , then inflated to 8 MARIE for
0:14 (min:sec) 0.
16:13:41 Inflation number: 4 The EMERGE OTW 4.0 x 20 balloon
([id number]) was reinflated across the Prox RCA 0, to
8 MARIE for 0:21 (min:sec) .
16:14:03 Wire removed.
16:14:04 Guide catheter removed.
16:14:14 A DIAGNOSTIC JL 4.0 5Fr catheter ([id number]) was
advanced over the wire and used for Procedure.
16:15:44 LCA angiography performed.
16:18:04 Catheter removed.
16:18:17 A DIAGNOSTIC Pigtail 5Fr catheter ([id number]) was
advanced over the wire and used for Procedure.
16:18:21 LV gram done using MADSEN
16:19:10 Integrilin Drip (75mg/100ml) 18.2 ml/hr I.V. drip was
 
administered by Yazmin Marx RN; for antiplatelet
therapy;
16:19:24 EF : 55 %
16:: Catheter removed.
16:19:36 EXOSEAL 6Fr () opened to sterile field.
16:19:57 **ACC** Pre-intervention HAKEEM Flow is 0.
16:23:10 Sheath removed intact; hemostasis achieved with
Exoseal to the Right Femoral artery.
16:23:13 Procedure ended.(Physican Out)
16::27 Fluoroscopy time 05.10 minutes.
16::33 Fluoroscopy dose: 851 mGy
16::33 Flurop Dose total: 851
16::42 Dose Area Product 34360 mGy/cm.
16::36 Contrast amount:Isovue 370 93ml.
16:24:42 Maximum allowable dose exceeded? No.
16:24:43 ACT drawn and resulted at 207 seconds. (normal
therapeutic range 180-240 seconds).
16:24:43 Sharps counted by scrub and verified by R.N.
16:24:46 Insertion/operative site no bleeding no hematoma.
16:24:50 Post right femoral artery:stable
16:24:53 Post Procedure Pulses reassessed and unchanged
16:25:00 Post-procedure physical assessment completed. ASA
score P 3 - A patient with severe systemic disease as
per Matt Pitts MD.
16:25:05 Post procedure rhythm: sinus rhythm
16:25:09 Estimated blood loss: 10 ml
16:25:11 Post procedure instruction explained to
patient.Patient verbalizes understanding.
16:25:43 Procedure and supply charges have been captured,
reviewed, submitted and are correct.
16:26:37 Procedure Complication : No complications
16:29:17 Vital chart was stopped
16:29:19 See physician's report for complete and final results.
16:29:24 Report given to CVICU.
16:29:28 Patient transfered to CVICU with Bed.
16::31 Full Disclosure recording stopped
16::31 Procedure ended.
16:29:47 End room use (Document Last)
16:34:04 Yazmin Marx RN was relieved by Zack CONNELL(R) as
monitoring person
Intervention Summary
Intervention Notes
Time     ActionType  Lesion and  Equipment    Action#  Pressure  Duration
Attributes  Used
16:05:01 Inflate     Prox RCA    EMERGE OTW   1        7         00:17
balloon                 3.5 x 15
balloon
([id number])
16:07:53 Reinflate   Prox RCA    EMERGE OTW   2        10        00:23
balloon                 3.5 x 15
balloon
([id number])
16:12:44 Inflate     Prox RCA    EMERGE OTW   3        8         00:14
balloon                 4.0 x 20
balloon
([id number])
16:13:41 Reinflate   Prox RCA    EMERGE OTW   4        8         00:21
balloon                 4.0 x 20
balloon
([id number])
 
Device Usage
Item Name    Manufacture  Quantity  Catalog Number  Hospital Part    Current Min
imal Lot# /
Charge   Number  Stock   Stock   Serial#
Code
Bag Decanter Microtek     1                    684656   53073   820107  5
()      Medical Inc.
ACIST        Acist        1         41373           893509   350615  576987  20
Syringe      Medical
(09370)      Systems Inc
Medline Cath Medline      1         [id number]       668785   86378   476248  5
Pack
([id number])
ACIST Hand   Acist        1         28387           015655   347368  441897  5
Control      Medical
(72913)      Systems Inc
ACIST        Acist        1         29185           871559   911818  739749  5
Manifold     Medical
(22037)      Systems Inc
EMERALD      Cardinal     1         502-455         571481   172483  463446  5
Guide Wire   Health
(502-455)
INFLATOR     [facility]        1         PT1506          398682   881177  522690  15
[facility]        Medical
BasixCompak
(BZ6382)
SHEATH 6FR   Terumo       1         PYP914          551629   463885  612234  40
[place]
(GYF217)
WHISPER      Alejandro       1         [id number]       686659   101583  075115  5
300cm guide  Vascular
wire
(8495059AT)
GUIDE 6FR HS Medtronic    1         LA6HSI          163976   27181   550288  1
I catheter
(LA6HSI)
EMERGE OTW   Harrison       1         [id number]  497288   067925  570250  5  
     [id number]
3.5 x 15     Scientific
balloon
([id number])
EMERGE OTW   Harrison       1         [id number]  453286   202278  070230  5  
     [id number]
4.0 x 20     Scientific
balloon
([id number])
DIAGNOSTIC   Cardinal     1         000744B         626410   385491  807408  10
JL 4.0 5Fr   Health
catheter
(505970P)
DIAGNOSTIC   Cardinal     1         478657R         406508   748744  058902  5
Pigtail 5Fr  Health
catheter
(259570O)
EXOSEAL 6Fr  Cardinal     1                    675185   237338  493885  10
()      Health
Signature Audit [place]
Stage           Time        Signature      Unsigned
Intra-Procedure 2019   Zack Calle
4:34:49 PM  RT(R)
 
Signatures
Nurse : Yazmin Marx RN   Signature :
______________________________
Date : ______________ Time :
______________
Performing Physician :  Signature :
Matt Pitts MD      ______________________________
Date : ______________ Time :
______________
Monitor : Jazmyn Lundberg Signature :
RT                       ______________________________
Date : ______________ Time :
______________
 
 
 
 
 
 
 
 
 
 
 
 
 
 
 
 
 
 
 
 
 
 
 
 
 
 
 
 
 
 
 
 
 
 
 
 
 
 
 
 
 
 
[address] [place] [address]

## 2019-07-27 NOTE — NUR
1900
ASSESSMENT COMPLETED AT [facility] TIME, PT DENIES COMPLAINTS.
 
2024
PT C/O VELEZ, PT WAS INFORMED THAT THE NURSE WOULD CALL DR. FLOREZ.
 
2027
DR FLOREZ CALLED BACK WITH NEW ORDERS NOTED

## 2019-07-27 NOTE — NUR
2300
REASSESSMENT COMPLETED AT THIS TIME, PT STATES THAT HER HEAD WAS STARING TO
HURT A LITTLE BIT, BUT DENIED ANY OTHER COMPLAINTS AT THIS TIME

## 2019-07-28 NOTE — HP
PATIENT: ANUSHKA GARNICA                        MEDICAL RECORD: [id number]
ACCOUNT: [id number]                                    [facility]ATION:[facility]
: [birth year]                                            ADMISSION DATE: 19
                                                         PCP: INA MITCHELL DO    
 
                             HISTORY AND PHYSICAL EXAMINATION
 
 
HISTORY OF PRESENT ILLNESS:  A 49-year-old female with a history of coronary
artery disease, status post stent in the right by Dr. Garcia in 2019,
presented with severe chest pain, shortness of breath, found to have inferior
myocardial infarction and brought to cath lab on an urgent basis.
 
PAST MEDICAL HISTORY:  Includes,
1.  History of hypertension.
2.  Hyperlipidemia.
3.  Coronary artery disease as described above.
4.  Hypothyroidism.
 
MEDICATIONS:  Typically include aspirin 81 mg every day, atorvastatin 10 every
day, Lipitor 25 every day, Ativan 2 mg at bedtime p.r.n., and Synthroid 125
every day.
 
SOCIAL HISTORY:  Smokes about a pack a day.  No set exercise program and she
takes care of her ADLs.
 
PHYSICAL EXAMINATION:
GENERAL:  Pleasant female in no acute distress, appears stated age.
VITAL SIGNS:  Blood pressure 130/82, pulse 75 and regular.
HEENT:  Normocephalic, atraumatic.
NECK:  No bruits noted.
HEART:  Regular.  2/6 systolic ejection murmur.
LUNGS:  Good air excursion.
ABDOMEN:  Soft, nontender.
EXTREMITIES:  Pulse 2+.  No edema.
 
IMPRESSION:  Acute inferior myocardial infarction.
 
PLAN:  Cath lab on an urgent basis.
 
TRANSINT:[id number] Voice Confirmation ID: [id number] DOCUMENT ID: [id number]
 
 
                                           
                                           LUCAS SANCHEZ MD          
 
 
 
Electronically Signed by LUCAS SANCHEZ on 19 at 0930
 
CC:                                                             [id number]
DICTATION DATE: 19 1634     :     19      ADM IN  
                                                                              
[facility]                                          
[address] [place] [address]

## 2019-07-28 NOTE — NUR
0510
PT ASSISTED UP TO THE BATHROOM. PT VOIDED LARGE AMOUNT OF A DARK MICHAEL URINE.
PT C/O SLIGHT DIZZINESS AND WAS ASSISTED BACK TO BED. RT GROIN PUNCTURE
SITE EVALUATED,
SLIGHT TENDERNESS, NO SWELLING OR PULSATILE MASSES NOTED. SENSATION, MOVEMENT,
AND PULSE 2+ TO RIGHT LOWER EXT.

## 2019-08-05 NOTE — OP
PATIENT NAME:  ANUSHKA GARNICA                  MEDICAL RECORD: [id number]
:70                                             LOCATION:[facility]
                                                         ADMISSION DATE:19
SURGEON:  LUCAS SANCHEZ MD          
 
 
DATE OF OPERATION:  2019
 
PROCEDURE:  Left heart catheterization, selective coronary angiography, plus
PTCA stent to the right coronary, right femoral artery approach.
 
PROCEDURE IN DETAIL:  A 6-Setswana sheath was placed in the right femoral artery. 
A  film showed totally occluded right coronary at the placement of
previously placed stent.  Using a hockey stick guide catheter placed 300 cm
Whisper wire, 100% occluded right coronary down the distal portion of the
vessel.  We used a 3.5 x 15 and 4.0 x 20 [place] balloon up to 18 atmospheres. 
This shows excellent resolution of 100% stenosis, no significant residual.  HAKEEM
flow was 3 throughout the procedure.  Integrilin was used during the case.
 
FINDINGS:  Left ventriculography, 30-degree MADSEN view shows inferior basilar
hypokinesis.  Overall, LV function appears preserved at 50%.
 
CORONARY ANATOMY:
LEFT SYSTEM:  LAD is free of disease in the diagonal system.
CIRCUMFLEX:  Free of disease in the marginal system.
 
IMPRESSION:  Successful emergent percutaneous transluminal coronary angioplasty
stent to totally occluded right.
 
TRANSINT:[id number] Voice Confirmation ID: [id number] DOCUMENT ID: [id number]
                                           
                                           LUCAS SANCHEZ MD          
 
 
 
Electronically Signed by LUCAS SANCHEZ on 19 at 1355
 
 
 
 
 
 
 
 
 
 
 
 
 
 
CC:                                                             [id number]
DICTATION DATE: 19 1633     :     19 0153      DIS IN  
                                                                      19
[facility]                                          
[address] [place] [address]

## 2019-08-05 NOTE — DS
PATIENT:ANUSHKA GARNICA          :70   MEDICAL RECORD: [id number]
 
                              DISCHARGE SUMMARY
                                                         
ADMISSION DATE:    19                       DISCHARGE DATE:     19
 
 
DATE OF ADMISSION:  2019
 
DATE OF DISCHARGE:  2019
 
IMPRESSION:  Acute inferior myocardial infarction.
 
BRIEF HISTORY AND HOSPITAL COURSE:  Admitted with acute myocardial infarction,
found to have a stent thrombosis, delayed thrombosis, underwent intervention,
did quite well postoperatively, discharged to home in good condition.
 
Plan was to be long term at this point on a lifelong beta-blockade and statin,
they were both increased.  Discharged home in good condition.  Follow up with
myself in 2 to 3 weeks.
 
DIET:  AHA diet.
 
ACTIVITY:  As tolerated.
 
We will refer to cardiac rehab as outpatient.
 
TRANSINT:[id number] Voice Confirmation ID: [id number] DOCUMENT ID: [id number]
                                           
                                           LUCAS SANCHEZ MD          
 
 
 
Electronically Signed by LUCAS SANCHEZ on 19 at 1355
 
 
 
 
 
 
 
 
 
 
 
 
 
 
 
 
CC:                                                             [id number]
DICTATION DATE: 19 0935     :     19 2347      DIS IN  
                                                                      19
[facility]                                          
[address] [place] [address]

## 2019-10-14 NOTE — EC
PATIENT:ANUSHKA GARNICA        DATE OF SERVICE: 10/09/19
SEX: F                                  MEDICAL RECORD: [id number]
DATE OF BIRTH: [birth year]                        [facility]          
AGE OF PATIENT: 49                             ADMISSION DATE: 10/09/19
 
REFERRING PHYSICIAN:                               
 
INTERPRETING PHYSICIAN: LUCAS SANCHEZ MD          
 
 
 
                             ECHOCARDIOGRAM REPORT
  ECHO CHARGES 4               ECHO COMPLETE                 Date: 10/09/19
 
 
 
CLINICAL DIAGNOSIS: HTN,   HX CAD                 
 
                         ECHOCARDIOGRAPHIC MEASUREMENTS
      (adult normal given)
   AC root (d.<3.7cm) 3.5  cm   LV Septum d (<1.2 cm> 1.3  cm
      Valve Excursion 1.8  cm     LV Septum (systole) 1.6  cm
Left Atria (s.<4.0cm> 3.3  cm          LVPW d(<1.2cm) 1.6  cm
        RV (d.<2.3cm) 3.4  cm           LVPW (sytole) 1.9  cm
  LV diastole(<5.6CM) 5.1  cm       MV E-F(>70mm/sec)      cm
           LV systole 3.7  cm           LVOT Diameter 2.3  cm
       MV exc.(>10mm) 1.9  cm
Est.ejection fraction (50-75%)     %
 
   DOPPLER:
     LVIT      cm/sec A 54.0 cm/sec E 65.0  cm/sec
       LA      cm/sec      RVSP 17   mmHg
     LVOT 95   cm/sec   AOP1/2T      m/s
  Asc. Ao 125  cm/sec
     RVOT 62   cm/sec
       RA      cm/sec
       PA 94   cm/sec
 AV Gradient Peak 6.29 mmHg  AV Mean 3.19 mmHg  AV Area 3.4  cm
 MV Gradient Peak 3.16 mmHg  MV Mean 1.35 mmHg  MV Area      cm
   COMMENTS:                                              
 
 
 Cardiac Sonographer: 2               TRES ROJAS              
      Cardiologist: 3          Dr. Russell             
             TAPE# PACS           
                                       Pericardial Effusion N                        
 
 
DATE OF SERVICE:  
 
Adequate 2D, color flow, spectral Doppler, and M-mode.  Mild LVH.  LV internal
dimensions are normal.  Wall motion is normal.  EF is greater than or equal to
55%.  Aortic valve is tricuspid.  No evidence of stenosis by Doppler
interrogation.  Left atrium is normal.  Mitral valve shows no prolapse.  Trace
MR.  Right-sided chamber is grossly normal.  Trace TR.
 
TRANSINT:[id number] Voice Confirmation ID: [id number] DOCUMENT ID: [id number]
 
 
 
ECHOCARDIOGRAM REPORT                          [id number]    DANIKA,ANUSHKA SANCHEZ,LUCAS RECINOS MD          
 
 
 
Electronically Signed by LUCAS SANCHEZ on 10/14/19 at 1329
 
 
 
 
 
 
 
 
 
 
 
 
 
 
 
 
 
 
 
 
 
 
 
 
 
 
 
 
 
 
 
 
 
 
 
 
 
 
 
 
CC:                                                             [id number]
DICTATION DATE: 10/10/19 1319     :     10/10/19 1331      DEP CLI 
                                                                      10/09/19
[facility]                                          
[address] [place] [address]

## 2020-06-19 NOTE — OP
PATIENT NAME:  ANUSHKA GARNICA                  MEDICAL RECORD: [id number]
:70                                             LOCATION:D.M2     D.2124
                                                         ADMISSION DATE:20
SURGEON:  LUCAS SANCHEZ MD          
 
 
DATE OF OPERATION:  2020
 
PROCEDURE:  Left heart catheterization, selective coronary angiography, right
femoral artery approach.
 
CATHETERS:  A 5-Tajik sheath, 5/4 left and right Audrey, 5/4 pig.
 
The procedure was well tolerated.  The patient returned to paz.  Sheath
removed.  ExoSeal device placed.
 
FINDINGS:  Left ventriculography in 30-degree MADSEN view shows inferior apical
hypokinesis.  Overall, LV function is well preserved at 50% or better.
 
CORONARY ANATOMY:
LEFT MAIN:  Left main is free of disease.
LAD:  LAD is free of disease in the diagonal system.
CIRCUMFLEX:  Free of disease in the marginal system.
RIGHT CORONARY ARTERY:  Occluded with proximal placed stent with good left to
right collaterals.
 
IMPRESSION:  Single-vessel disease at this point.  LV function remains normal. 
Medical management.  Good prognosis.
 
TRANSINT:[id number] Voice Confirmation ID: [id number] DOCUMENT ID: [id number]
                                           
                                           LUCAS SANCHEZ MD          
 
 
 
 
 
 
 
 
 
 
 
 
 
 
 
 
 
 
CC:                                                             [id number]
DICTATION DATE: 20 1344     :     20 2151      DIS IN  
                                                                      20
[facility]                                          
[address] [place] [address]

## 2020-06-19 NOTE — CN
PATIENT NAME:ANUSHKA GARNICA                      MEDICAL RECORD: [id number]
: 70                                              LOCATION:D. D.2124
ADMIT DATE: 20                                       ACCOUNT: [id number]
CONSULTING PHYSICIAN:    LUCAS SANCHEZ MD          
                                               
REFERRING PHYSICIAN:     MELITA SORIANO DO            
 
 
DATE OF CONSULTATION:  2020
 
HISTORY OF PRESENT ILLNESS:  A 50-year-old female with known history of coronary
artery disease, status post inferior myocardial infarction, subsequent
intervention at that time.  She has history of hypertension, hyperlipidemia,
about a 2-week history of progressive chest tightness and pressure with
exertion, awoken with class IV symptomatology from sleep last night, radiating
to the jaw accompanied by shortness of breath and nausea.  We are asked to see
her concerning her cardiovascular status.
 
PAST MEDICAL HISTORY:  Includes;
1.  History of hypertension.
2.  Hyperlipidemia.
3.  Hypothyroidism, on replacement.
 
ALLERGIES:  INCLUDE SULFA, ERYTHROMYCIN, TRIMETHOPRIM AND LATEX.
 
MEDICATIONS:  Include Synthroid 125 mcg every day, amlodipine 5 mg every day,
metoprolol 50 every day, atorvastatin 20 every day, Plavix 75 every day, aspirin
81 every day.
 
SOCIAL HISTORY:  Nonsmoker, nondrinker.  Easily takes care of all her ADLs, does
exercise on a regular basis typically.
 
REVIEW OF SYSTEMS:  The patient reports easy bruising but reports no swollen
glands. The patient reports no fever, no night sweats, no significant weight
gain, no significant weight loss.  No significant exercise tolerance.  The
patient reports no dry eyes, no irritation, no vision change.  Patient reports
no difficulty hearing and no ear pain.  Patient reports no frequent nose bleeds
or nose and sinus problems.  Patient reports on arm pain on exertion.  No
shortness of breath while lying down.  No history of heart murmur.  Patient
reports no cough, no wheezing or coughing up blood.  Patient reports no
abdominal pain, no vomiting.  Normal appetite.  No diarrhea and not vomiting
blood.  No nausea and no constipation.  Patient reports no incontinence.  No
difficulty urinating.  No hematuria.  No increased frequency.  Patient reports
no muscle aches.  No weakness, no arthralgias, no back pain.  No swelling of the
extremities.  Patient reports no abnormal mole, no jaundice, no rashes.  Reports
no loss of consciousness.  No weakness and no numbness.  No seizures, dizziness,
or headaches.  The patient reports no depression, no sleep disturbance, feeling
safe in a relationship and no alcohol abuse.  Patient reports on fatigue. 
Reports no runny nose or sinus pressure.  No itching, no hives, and no frequent
sneezing.
 
PHYSICAL EXAMINATION:
GENERAL:  No acute distress, appears stated age.
VITAL SIGNS:  Blood pressure 139/77, pulse 97, regular.
HEENT:  Normocephalic, atraumatic.
NECK:  No JVD or bruit.
HEART:  Regular, II/VI systolic ejection murmur, questionable S4 gallop.
LUNGS:  Good air excursion.
 
 
 
CONSULT REPORT                                 [id number]    ANUSHKA GARNICA    
 
 
ABDOMEN:  Soft, nontender.
EXTREMITIES:  Pulses 2+ with no edema.
 
DIAGNOSTIC DATA:  EKG shows nonspecific ST-T changes.
 
IMPRESSION:  Acute coronary syndrome.  No history of coronary artery disease,
multiple risk factors.
 
PLAN:  For angiography, intervention based on the above.
 
TRANSINT:[id number] Voice Confirmation ID: [id number] DOCUMENT ID: [id number]
                                           
                                           LUCAS SANCHEZ MD          
 
 
 
 
 
 
 
 
 
 
 
 
 
 
 
 
 
 
 
 
 
 
 
 
 
 
 
 
 
 
 
 
 
 
CC:                                                             [id number]
DICTATION DATE: 20     :     20 1629      ADM IN  
                                                                              
[facility]                                          
[address] [id number]

## 2020-06-19 NOTE — NUR
NEW PATIENT ADMIT FROM ER VIA WC ACCOMPAINED BY [facility]
STAFF. PATIENT TRANSFERRED TO BE WITHOUT DIFFICULTY. PATIENT IS ALERT AND
ORIENTED X 4. PATIENT DENIES ANY NEEDS OR PAIN. ASSESSEMENT COMPLETED. PATIENT
ORIENTED TO ROOM AND CALL LIGHT. PATIENT WIPED DOWN WITH HIBICLENS WIPES AND
CONSENTS SIGNED AND ON THE CHART. WILL CONTINUE WITH PLAN OF CARE. SR UP X 2
BED IN LOW POSITION AND CALL LIGHT IN REACH.

## 2020-06-19 NOTE — NUR
PATIENT IS STABLE AND VSS. PATIENT DENIES ANY NEEDS OR PAIN. PER PHONE CALL
FROM CATH LAB TEAM, PATIENT PRE OP ACCORDING TO MAR ORDERS. PATIENT TOLERATED
WELL. WILL CONTINUE TO MONITOR. SR UP X 2 BED IN LOW POSITION AND CALL LIGHT
IN REACH.

## 2020-06-19 NOTE — HEMODYNAMI
PATIENT:ANUSHKA GARNICA                         MEDICAL RECORD: [id number]
: 70                                            [facility]ATION[facility]     D[facility]2[address]
[id number]                                       ADMISSION DATE: 20
 
 
 Generatedon:202013:38
Patient name: ANUSHKA GARNICA Patient #: [id number] Visit #: [id number] SSN: 
: 1970
Date of study: 2020
Page: Of
Hemodynamic Procedure Report
****************************
Patient Data
Patient Demographics
Procedure consent was obtained
First Name: ANUSHKA        Gender: Female
Last Name: DANIKA          : 1970
Middle Initial: GAVIN     Age: 50 year(s)
Patient #: [id number]       Race: Unknown
Visit #: [id number]
Accession #:
[id number]
Additional ID: [id number]
Contact details
Address: [address] [place] [address] Phone: [phone]
State: [place]
City: [place]
Zip code: [address]
Past Medical History
Allergies
Allergen  Reaction  Date      Comments
Reported
Other               2019 BACTRIM, ERYTHROMYCIN, PEPCID,FLONASE,
allergy                       DILAUDID, SULFA, LATEX
Other               2019 See Chart
allergy
Other               2020 sulfa,rubber,famotidine,hydromorphone,
allergy                       sulfamethoxazole, fluticasone
Admission
Admission Data
Admission Date: 2020   Admission Time: 6:05
Room #: D.2124              Insurance Payor: Private
health insurance
Height (in.): 65            BSA: 2.15 (m2)
Height (cm.): 165.1         BMI: 40.6 (kg/m2)
Weight (lbs.): 244
Weight (kg.): 110.68
Medications upon Admission
Medications  Dosage  Times Administered  Last     Remarks
per                 Delivery
Day                 Date and
Time
ACE
Inhibitor
(any)
Current Diagnosis
Diagnosis                       Description
Unstable angina
Lab Results
 
Lab Result Date: 2020  Lab Result Time: 0:00
Biochemistry
Name         Units    Result                Min      Max
Creatinine   mg/dl    1.1      --(--*-)--   0.6      1.3
eGFR         ml/min   56       *-(----)--   90       120
NONAFRICAN
CBC
Name         Units    Result                Min      Max
Hematocrit   %        44.5     --(*---)--   42       54
Hemoglobin   g/dl     14.6     --(-*--)--   13.5     17.5
Procedure
Procedure Types
Cath Procedure
Diagnostic Procedure
[facility] w/Coronaries
Procedure Description
Procedure Date
Procedure Date: 2020
Procedure Start Time: 13:24
Procedure End Time: 13:35
Procedure Staff
Name                            Function
Matt Pitts MD              Performing Physician
Cathryn Ceja RT              Monitor
Penny Corea RT               Monitor
Jazmyn Lundberg RT               Scrub
Tosha Napoles RN                Nurse
Procedure Data
Cath Procedure
Fluoroscopy
Diagnostic fluoroscopy      Total fluoroscopy Time: 1.3
time: 1.3 min               min
Diagnostic fluoroscopy      Total fluoroscopy dose: 549
dose: 549 mGy               mGy
Contrast Material
Contrast Material Type                       Amount (ml)
Isovue 300                                   42
Entry Location
Entry     Primary  Successful  Side  Size  Upsize Upsize Entry    Closure Succes
sful  Closure
Location                             (Fr)  1 (Fr) 2 (Fr) Remarks  Device        
      Remarks
Femoral                        Right 5 Fr                         Exoseal
artery
Estimated blood loss: 5 ml
Diagnostic catheters
Device Type               Used For           End Catheter
Placement
MULTIPACK JL 4.0 5Fr      Procedure
catheter
MULTIPACK 3DRC 5Fr        Procedure
catheter
MULTIPACK Pigtail 5 Fr    Procedure
catheter
Procedure Complications
No complications
Procedure Medications
Medication           Administration Route Dosage
0.9% NaCl            I.V.                 100 ml/hr
 
Oxygen               etCO2 Nasal cannula  2 l/min
Lidocaine 2%         added to field       20
Heparin Flush Bag    added to field       2 bags
(1000units/500ml NS)
Versed               I.V.                 2 mg
Fentanyl             I.V.                 50 mcg
Versed               I.V.                 2 mg
Fentanyl             I.V.                 50 mcg
Hemodynamics
Rest
BSA: 2.15 (m2) HGB: 14.6 (g/dl) O2 Consumption: Estimated: 214.7 (ml/min) O2 Con
sumption
indexed: Estimated:99.86 (ml/min/m) Heart Rate: 75 (bpm)
Pressure Samples
Time     Site     Value (mmHg) Purpose      Heart      Use
Rate(bpm)
13:32    LV       110/10,14    Snapshot     78
Gradients
Valve  Time  Site Site Mean    SEP/DFP    Peak To Heart  Use
1    2    (mmHg)  (sec/min)  Peak    Rate
(mmHg)  (bpm)
Aortic 13:32 LV   AO                              77
Snapshots
Pre Cath      Intra         NCS           Post Cath
Vital Signs
Time     Heart  Resp   SPO2 etCO2   NIBP       Rhythm  Pain    Sedation
Rate   (ipm)  (%)  (mmHg)  (mmHg)             Status  Level
(bpm)
13:13:10 72     20     96   30.7    123/75(98) NSR     0 (11)  10(A)
, No
pain
13:17:29 75     18     97   30      108/70(85) NSR     0 (11)  10(A)
, No
pain
13:21:49 74     18     97   29.2    108/66(84) NSR     0 (11)  10(A)
, No
pain
13:26:07 73     19     96   34.5    112/70(84) NSR     0 (11)  10(A)
, No
pain
13:30:27 70     16     97   27      112/67(82) NSR     0 (11)  10(A)
, No
pain
13:34:45 74     19     95   35.3    113/72(88) NSR     0 (11)  10(A)
, No
pain
Medications
Time     Medication       Route   Dose  Verified Delivered Reason     Notes  Eff
ectiveness
by       by
13:10:15 0.9% NaCl        I.V.    100   Matt  Tosha     used for
ml/hr Hong  Dony   procedure
MD MONAE
13:10:23 Oxygen           etCO2   2     Matt  Tosha     used for
Nasal   l/min Hong  Dony   procedure
cannula       MD       RN
13:10:28 Lidocaine 2%     added   20ml  Matt  Matt   for local
to      vial  [place]  Hong   anesthetic
field         MD       MD
13:10:33 Heparin Flush    added   2     Matt  Matt   used for
 
Bag              to      bags  [facility]   procedure
(1000units/500ml field MD SERRANO
NS)
13:24:05 Versed           I.V.    2 mg  Matt  Tosha     for
Hong  Dony   sedation
MD MONAE
13:24:10 Fentanyl         I.V.    50    Matt  Tosha     for
mcg   Hong  Dony   sedation
MD MONAE
13:29:32 Versed           I.V.    2 mg  Matt  Tosha     for
Hong  Dony   sedation
MD MONAE
13:29:37 Fentanyl         I.V.    50    Matt  Tosha     for
mcg   Hong  Dony   sedation
MD       RN
Procedure Log
Time     Note
12:55:08 Informed consent obtained and on chart
12:55:53 Penny Corea RT(R) sent for patient. Start room use.
12:56:01 **ACC** Patient presents with Unstable Angina CCS
Anginal Class 2--Slight limitation of ordinary
activity.
12:56:37 Time tracking: Regular hours (M-F 7:00 - 5:00)
12:56:41 Plan of Care:Hemodynamics will remain stable., Cardiac
rhythm will remain stable., Comfort level will be
maintained., Respiratory function will remain
adequate., Patient/ family verbilizes understanding of
procedure., Procedure tolerated without complication.,
Recovers from procedure without complications..
12:56:47 Full Disclosure recording started
12:56:52 H&P Date Dictated: 2020 ER History on chart..
12:56:57 Patient NPO since Midnight.
12:58:33 Patient allergic to Other
allergysulfa,rubber,famotidine,hydromorphone,
sulfamethoxazole, fluticasone
12:58:36 Is the patient allergic to Iodine/contrast media? No.
12:58:38 Was the patient premedicated? N/A
12:58:52 Is patient on blood thinner?Yes
13:00:25 **ACC** The patient was administered the following
blood thiners within the last 24 hours: **ACC**Plavix
13:00:55 Patient diabetic? No.
13:00:56 If diabetic: On Metformin? N/A
13:00:59 Patient not pregnant. Patient has had hysterectomy.
13:01:44 Insurance Payor : Private health insurance
13:01:53 Patient Weight : 244 lbs
13:01:57 Patient Height : 65 inches
13:02:06 Current Diagnosis : Unstable angina
13:02:35 Lab Result : Hematocrit 44.5 %
13:02:35 Lab Result : eGFR NONAFRICAN 56 ml/min
13:02:35 Lab Result : Creatinine 1.1 mg/dl
13:02:35 Lab Result : Hemoglobin 14.6 g/dl
13:03:01 Diagnostic Cath Status : Urgent
13:03:13 Stress Test: no; N/A ?
13:04:01 Patient received from Med II to CCL 1 Alert and
oriented. Tansferred to table in Supine position.
13:04:02 Warm blankets applied, and alayna hugger turned on for
patient comfort.
13:04:03 Correct patient and procedure confirmed by team.
13:04:03 ECG and BP/O2 sat monitors applied to patient.
13:04:07 Pre-procedure instructions explained to patient.
 
13:04:11 Pre-op teaching completed and patient verbalized
understanding.
13:04:51 IV patent on arrival in left forearm with 0.9% NaCl at
KVO.
13:06:04 Risk of Mortality: 0.1
13:06:06 Risk of blood transfusion: 0.7
13:06:08 Risk of HOLA: 0.3
13:06:30 Pre procedure: right dorsailis pedis pulse 2+ Normal;
easily identifiable; not easily obliterated
13:06:33 Modified Judson's test Radial > 7 seconds.
13:06:36 Patient pain scale 0/10 ?.
13:06:44 Right groin area was prepped with chlora-prep and
draped in sterile fashion
13:06:53 Alarms reviewed by R. N.
13:06:53 Sharps counted by scrub and verified by R.N.
13:07:05 Previous problem with sedation/anesthesia? No ?
13:07:07 Snore? No
13:07:09 Sleep apnea? No
13:07:11 Deviated septum? No
13:07:12 Opens mouth fully? Yes
13:07:14 Sticks out tongue? Yes
13:07:16 Airway obstruction? No ?
13:07:20 Dentures? No ?
13:07:29 Use device set Femoral Dx
13:07:30 ACIST Syringe (09423) opened to sterile field.
13:07:30 Bag Decanter (2002S) opened to sterile field.
13:07:32 Medline Cath Pack ([id number]) opened to sterile field.
13:07:34 DIAGNOSTIC Multipack 5Fr catheter set (OW2650) opened
to sterile field.
13:07:37 ACIST Hand Control (94077) opened to sterile field.
13:07:38 ACIST Manifold (47846) opened to sterile field.
13:07:40 EMERALD Guide Wire (972-789) opened to sterile field.
13:07:40 SHEATH 5FR [place] (PXM926) opened to sterile field.
13:09:56 Baseline sample Acquired.
13:10:01 Rhythm: sinus rhythm
13:10:08 Vital chart was started
13:10:15 0.9% NaCl 100 ml/hr I.V. was administered by Tosha Napoles RN; used for procedure; Verbal order read back
and verified.
13:10:23 Oxygen 2 l/min etCO2 Nasal cannula was administered by
Tosha Napoles RN; used for procedure; Verbal order
read back and verified.
13:10:28 Lidocaine 2% 20ml vial added to field was administered
by Matt Pitts MD; for local anesthetic; Verbal
order read back and verified.
13:10:33 Heparin Flush Bag (1000units/500ml NS) 2 bags added to
field was administered by Matt Pitts MD; used for
procedure; Verbal order read back and verified.
13:19:17 --------ALL STOP TIME OUT------
13:19:17 Final Timeout: patient, procedure, and site verified
with staff and physician. All members of the team are
in agreement.
13:19:18 Right groin site verified by team.
13:19:21 Fire Safety Assessment: A--An alcohol-based skin
anteseptic being used preoperatively., C--Open oxygen
or nitrous oxide is being used., D--An ESU, laser, or
fiber-optic light is being used.
13:19:27 Physical assessment completed. ASA score P 2 - A
patient with mild systemic disease as per Matt Pitts MD.
 
13:19:40 3a) 45-59 Moderately reduced kidney function.
13:19:51 Maximum allowable contrast dose (3.7 X eGFR X 0.75)155
ml.
13:19:55 Sedation plan: IV Moderate Sedation Medication:Versed,
Fentanyl
13:23:18 Zero performed for pressure channel P1
13:24:01 Procedure started.
13:24:05 Versed 2 mg I.V. was administered by Tosha Napoles RN;
for sedation; Verbal order read back and verified.
13:24:10 Fentanyl 50 mcg I.V. was administered by Tosha Napoles
RN; for sedation; Verbal order read back and verified.
13:24:27 Local anesthetic to right femoral artery with
Lidocaine 2% by Matt Pitts MD.**INITIAL ACCESS
ONLY**
13:27:50 A 5 Fr sheath was inserted into the Right Femoral
artery
13:27:56 A MULTIPACK JL 4.0 5Fr catheter was advanced over the
wire and used for Procedure.
13:29:21 LCA angiography performed.
13:29:32 Versed 2 mg I.V. was administered by Tosha Napoles RN;
for sedation; Verbal order read back and verified.
13:29:37 Fentanyl 50 mcg I.V. was administered by Tosha Napoles RN; for sedation; Verbal order read back and verified.
13:29:39 Catheter removed.
13:29:53 A MULTIPACK 3DRC 5Fr catheter was advanced over the
wire and used for Procedure.
13:31:06 RCA angiography performed.
13:31:07 **ACC**Dominant side:Left
13:31:35 Catheter removed.
13:31:43 A MULTIPACK Pigtail 5 Fr catheter was advanced over
the wire and used for Procedure.
13:32:02 LV gram done using MADSEN
13:32:13 LV hemodynamics recorded.
13:32:16 Injector settings: Ml/sec: 10, Volume: 20,
13:32:25 EF : 55 %
13:32:39 Catheter removed.
13:33:15 EXOSEAL 5Fr () opened to sterile field.
13:33:30 Sheath removed intact; hemostasis achieved with
Exoseal to the Right Femoral artery.
13:33:34 Procedure ended.(Physican Out)
13:33:44 Contrast amount:Isovue 300 42ml.
13:33:51 Fluoroscopy time 01.30 minutes.
13:33:55 Flurop Dose total: 549
13:33:55 Fluoroscopy dose: 549 mGy
13:34:02 Dose Area Product 48737 mGy/cm.
13:34:10 Post-procedure physical assessment completed. ASA
score P 2 - A patient with mild systemic disease as
per Matt Pitts MD.
13:34:27 Post procedure rhythm: sinus rhythm
13:34:29 Estimated blood loss: 5 ml
13:34:30 Post procedure instruction explained to
patient.Patient verbalizes understanding.
13:34:31 Patient needs reinforcement of post procedure
teaching.
13:35:29 Procedure and supply charges have been captured,
reviewed, submitted and are correct.
13:35:32 Procedure Complication : No complications
13:35:34 Vital chart was stopped
13:35:35 [facility] Findings: mild to moderate CAD (<70%)
13:35:37 Operative report dictated upon procedure completion.
 
13:35:37 See physician's report for complete and final results.
13:35:40 Report given to Med II.
13:35:42 Patient transfered to Med II with Bed.
13:35:45 Procedure ended.
13:35:45 Full Disclosure recording stopped
13:35:48 End room use (Document Last)
Device Usage
Item Name   Manufacture  Quantity  Catalog    Hospital Part    Current Minimal L
ot# /
Number     Charge   Number  Stock   Stock   Serial#
Code
ACIST       Acist        1         90435      [id number]   976010  [id number]  20
Syringe     Medical
(78960)     Systems Inc
Bag         Microtek     1               123896   13546   037013  5
Decanter    Medical Inc.
()
Medline     Medline      1         [id number]  542069   64053   639435  5
Cath Pack
([id number])
DIAGNOSTIC  Cardinal     1         NR2363     563191   46161   736650  30
Multipack   Health
5Fr
catheter
set
([id number])
ACIST Hand  Acist        1         39369      741070   849127  106043  5
Control     Medical
(95175)     Systems Inc
ACIST       Acist        1         33999      582178   126087  677361  5
Manifold    Medical
(67278)     Systems Inc
EMERALD     Cardinal     1         502-455    561154   601665  359852  5
Guide Wire  Health
(502-455)
SHEATH 5FR  [employer]       1         EDG375     345092   144232  773848  5
[place]
([id number])
MULTIPACK   Cardinal     1                                     045277  5
JL 4.0 5Fr  Health
catheter
MULTIPACK   Cardinal     1                                     793436  5
3DRC 5Fr    Health
catheter
MULTIPACK   Cardinal     1                                     172902  5
Pigtail 5   Health
Fr catheter
EXOSEAL 5Fr Cardinal     1               337048   784032  232023  10
()     Health
Signature Audit [place]
Stage           Time        Signature      Unsigned
Intra-Procedure 2020   Penny Corea
1:37:41 PM  RT(R)
Intra-Procedure 2020   Tosha Napoles
1:38:02 PM  RN
 
Intra-Procedure 2020   Matt Flores
1:38:20 PM  Peter SERRANO
 
 
 
 
 
 
 
 
 
 
 
 
 
 
 
 
 
 
 
 
 
 
 
 
 
 
 
 
 
 
 
 
 
 
 
 
 
 
 
 
 
 
 
 
 
 
 
 
 
 
 
 
 
[id number] [place] [address]